# Patient Record
Sex: FEMALE | Race: ASIAN | NOT HISPANIC OR LATINO | URBAN - METROPOLITAN AREA
[De-identification: names, ages, dates, MRNs, and addresses within clinical notes are randomized per-mention and may not be internally consistent; named-entity substitution may affect disease eponyms.]

---

## 2017-04-06 ENCOUNTER — OUTPATIENT (OUTPATIENT)
Dept: OUTPATIENT SERVICES | Facility: HOSPITAL | Age: 62
LOS: 1 days | Discharge: HOME | End: 2017-04-06

## 2017-04-06 VITALS
OXYGEN SATURATION: 96 % | DIASTOLIC BLOOD PRESSURE: 80 MMHG | HEIGHT: 61 IN | RESPIRATION RATE: 18 BRPM | TEMPERATURE: 98 F | HEART RATE: 81 BPM | WEIGHT: 139.99 LBS | SYSTOLIC BLOOD PRESSURE: 132 MMHG

## 2017-04-06 DIAGNOSIS — N63 UNSPECIFIED LUMP IN BREAST: Chronic | ICD-10-CM

## 2017-04-06 DIAGNOSIS — Z01.818 ENCOUNTER FOR OTHER PREPROCEDURAL EXAMINATION: ICD-10-CM

## 2017-04-06 DIAGNOSIS — Z96.659 PRESENCE OF UNSPECIFIED ARTIFICIAL KNEE JOINT: Chronic | ICD-10-CM

## 2017-04-06 DIAGNOSIS — M17.12 UNILATERAL PRIMARY OSTEOARTHRITIS, LEFT KNEE: ICD-10-CM

## 2017-04-06 DIAGNOSIS — M19.90 UNSPECIFIED OSTEOARTHRITIS, UNSPECIFIED SITE: ICD-10-CM

## 2017-04-06 DIAGNOSIS — H93.91 UNSPECIFIED DISORDER OF RIGHT EAR: Chronic | ICD-10-CM

## 2017-04-06 LAB
ANION GAP SERPL CALC-SCNC: 7 MMOL/L — SIGNIFICANT CHANGE UP (ref 5–17)
APTT BLD: 30.2 SEC — SIGNIFICANT CHANGE UP (ref 27.5–37.4)
BASOPHILS # BLD AUTO: 0.1 K/UL — SIGNIFICANT CHANGE UP (ref 0–0.2)
BASOPHILS NFR BLD AUTO: 1.7 % — SIGNIFICANT CHANGE UP (ref 0–2)
BLD GP AB SCN SERPL QL: SIGNIFICANT CHANGE UP
BUN SERPL-MCNC: 12 MG/DL — SIGNIFICANT CHANGE UP (ref 7–23)
CALCIUM SERPL-MCNC: 9.1 MG/DL — SIGNIFICANT CHANGE UP (ref 8.5–10.1)
CHLORIDE SERPL-SCNC: 106 MMOL/L — SIGNIFICANT CHANGE UP (ref 96–108)
CO2 SERPL-SCNC: 30 MMOL/L — SIGNIFICANT CHANGE UP (ref 22–31)
CREAT SERPL-MCNC: 0.63 MG/DL — SIGNIFICANT CHANGE UP (ref 0.5–1.3)
EOSINOPHIL # BLD AUTO: 0.2 K/UL — SIGNIFICANT CHANGE UP (ref 0–0.5)
EOSINOPHIL NFR BLD AUTO: 3.5 % — SIGNIFICANT CHANGE UP (ref 0–6)
GLUCOSE SERPL-MCNC: 91 MG/DL — SIGNIFICANT CHANGE UP (ref 70–99)
HBA1C BLD-MCNC: 5.9 % — HIGH (ref 4–5.6)
HCT VFR BLD CALC: 36.2 % — SIGNIFICANT CHANGE UP (ref 34.5–45)
HGB BLD-MCNC: 12.5 G/DL — SIGNIFICANT CHANGE UP (ref 11.5–15.5)
INR BLD: 1.04 RATIO — SIGNIFICANT CHANGE UP (ref 0.88–1.16)
LYMPHOCYTES # BLD AUTO: 1.8 K/UL — SIGNIFICANT CHANGE UP (ref 1–3.3)
LYMPHOCYTES # BLD AUTO: 30.4 % — SIGNIFICANT CHANGE UP (ref 13–44)
MCHC RBC-ENTMCNC: 30 PG — SIGNIFICANT CHANGE UP (ref 27–34)
MCHC RBC-ENTMCNC: 34.6 GM/DL — SIGNIFICANT CHANGE UP (ref 32–36)
MCV RBC AUTO: 86.6 FL — SIGNIFICANT CHANGE UP (ref 80–100)
MONOCYTES # BLD AUTO: 0.8 K/UL — SIGNIFICANT CHANGE UP (ref 0–0.9)
MONOCYTES NFR BLD AUTO: 12.6 % — SIGNIFICANT CHANGE UP (ref 2–14)
MRSA PCR RESULT.: SIGNIFICANT CHANGE UP
NEUTROPHILS # BLD AUTO: 3.1 K/UL — SIGNIFICANT CHANGE UP (ref 1.8–7.4)
NEUTROPHILS NFR BLD AUTO: 51.7 % — SIGNIFICANT CHANGE UP (ref 43–77)
PLATELET # BLD AUTO: 211 K/UL — SIGNIFICANT CHANGE UP (ref 150–400)
POTASSIUM SERPL-MCNC: 3.9 MMOL/L — SIGNIFICANT CHANGE UP (ref 3.5–5.3)
POTASSIUM SERPL-SCNC: 3.9 MMOL/L — SIGNIFICANT CHANGE UP (ref 3.5–5.3)
PROTHROM AB SERPL-ACNC: 11.4 SEC — SIGNIFICANT CHANGE UP (ref 9.8–12.7)
RBC # BLD: 4.18 M/UL — SIGNIFICANT CHANGE UP (ref 3.8–5.2)
RBC # FLD: 12.4 % — SIGNIFICANT CHANGE UP (ref 11–15)
S AUREUS DNA NOSE QL NAA+PROBE: SIGNIFICANT CHANGE UP
SODIUM SERPL-SCNC: 143 MMOL/L — SIGNIFICANT CHANGE UP (ref 135–145)
WBC # BLD: 6 K/UL — SIGNIFICANT CHANGE UP (ref 3.8–10.5)
WBC # FLD AUTO: 6 K/UL — SIGNIFICANT CHANGE UP (ref 3.8–10.5)

## 2017-04-06 RX ORDER — SODIUM CHLORIDE 9 MG/ML
3 INJECTION INTRAMUSCULAR; INTRAVENOUS; SUBCUTANEOUS EVERY 8 HOURS
Qty: 0 | Refills: 0 | Status: DISCONTINUED | OUTPATIENT
Start: 2017-05-04 | End: 2017-05-04

## 2017-04-06 NOTE — H&P PST ADULT - HISTORY OF PRESENT ILLNESS
61 year old female c/o  left knee pain with osteoarthritis scheduled for left knee replacement . [employer] used  # [id number].

## 2017-04-06 NOTE — H&P PST ADULT - NSANTHOSAYNRD_GEN_A_CORE
No. ALEJANDRO screening performed.  STOP BANG Legend: 0-2 = LOW Risk; 3-4 = INTERMEDIATE Risk; 5-8 = HIGH Risk

## 2017-05-04 ENCOUNTER — INPATIENT (INPATIENT)
Facility: HOSPITAL | Age: 62
LOS: 1 days | Discharge: HOME HEALTH | End: 2017-05-06
Attending: ORTHOPAEDIC SURGERY | Admitting: ORTHOPAEDIC SURGERY
Payer: MEDICARE

## 2017-05-04 ENCOUNTER — TRANSCRIPTION ENCOUNTER (OUTPATIENT)
Age: 62
End: 2017-05-04

## 2017-05-04 ENCOUNTER — RESULT REVIEW (OUTPATIENT)
Age: 62
End: 2017-05-04

## 2017-05-04 VITALS
SYSTOLIC BLOOD PRESSURE: 137 MMHG | RESPIRATION RATE: 15 BRPM | OXYGEN SATURATION: 100 % | WEIGHT: 139.99 LBS | DIASTOLIC BLOOD PRESSURE: 70 MMHG | HEART RATE: 83 BPM | HEIGHT: 61 IN | TEMPERATURE: 98 F

## 2017-05-04 DIAGNOSIS — N63 UNSPECIFIED LUMP IN BREAST: Chronic | ICD-10-CM

## 2017-05-04 DIAGNOSIS — Z96.659 PRESENCE OF UNSPECIFIED ARTIFICIAL KNEE JOINT: Chronic | ICD-10-CM

## 2017-05-04 DIAGNOSIS — H93.91 UNSPECIFIED DISORDER OF RIGHT EAR: Chronic | ICD-10-CM

## 2017-05-04 LAB
ANION GAP SERPL CALC-SCNC: 6 MMOL/L — SIGNIFICANT CHANGE UP (ref 5–17)
BUN SERPL-MCNC: 14 MG/DL — SIGNIFICANT CHANGE UP (ref 7–23)
CALCIUM SERPL-MCNC: 8.3 MG/DL — LOW (ref 8.5–10.1)
CHLORIDE SERPL-SCNC: 106 MMOL/L — SIGNIFICANT CHANGE UP (ref 96–108)
CO2 SERPL-SCNC: 29 MMOL/L — SIGNIFICANT CHANGE UP (ref 22–31)
CREAT SERPL-MCNC: 0.72 MG/DL — SIGNIFICANT CHANGE UP (ref 0.5–1.3)
GLUCOSE SERPL-MCNC: 118 MG/DL — HIGH (ref 70–99)
HCT VFR BLD CALC: 30 % — LOW (ref 34.5–45)
HGB BLD-MCNC: 10.8 G/DL — LOW (ref 11.5–15.5)
MCHC RBC-ENTMCNC: 31 PG — SIGNIFICANT CHANGE UP (ref 27–34)
MCHC RBC-ENTMCNC: 35.9 GM/DL — SIGNIFICANT CHANGE UP (ref 32–36)
MCV RBC AUTO: 86.3 FL — SIGNIFICANT CHANGE UP (ref 80–100)
PLATELET # BLD AUTO: 172 K/UL — SIGNIFICANT CHANGE UP (ref 150–400)
POTASSIUM SERPL-MCNC: 4.5 MMOL/L — SIGNIFICANT CHANGE UP (ref 3.5–5.3)
POTASSIUM SERPL-SCNC: 4.5 MMOL/L — SIGNIFICANT CHANGE UP (ref 3.5–5.3)
RBC # BLD: 3.48 M/UL — LOW (ref 3.8–5.2)
RBC # FLD: 12.4 % — SIGNIFICANT CHANGE UP (ref 11–15)
SODIUM SERPL-SCNC: 141 MMOL/L — SIGNIFICANT CHANGE UP (ref 135–145)
WBC # BLD: 5.2 K/UL — SIGNIFICANT CHANGE UP (ref 3.8–10.5)
WBC # FLD AUTO: 5.2 K/UL — SIGNIFICANT CHANGE UP (ref 3.8–10.5)

## 2017-05-04 PROCEDURE — 88305 TISSUE EXAM BY PATHOLOGIST: CPT | Mod: 26

## 2017-05-04 PROCEDURE — 88311 DECALCIFY TISSUE: CPT | Mod: 26

## 2017-05-04 PROCEDURE — 73560 X-RAY EXAM OF KNEE 1 OR 2: CPT | Mod: 26,LT

## 2017-05-04 RX ORDER — ENOXAPARIN SODIUM 100 MG/ML
30 INJECTION SUBCUTANEOUS EVERY 12 HOURS
Qty: 0 | Refills: 0 | Status: DISCONTINUED | OUTPATIENT
Start: 2017-05-05 | End: 2017-05-06

## 2017-05-04 RX ORDER — SODIUM CHLORIDE 9 MG/ML
1000 INJECTION, SOLUTION INTRAVENOUS
Qty: 0 | Refills: 0 | Status: DISCONTINUED | OUTPATIENT
Start: 2017-05-04 | End: 2017-05-04

## 2017-05-04 RX ORDER — FOLIC ACID 0.8 MG
1 TABLET ORAL DAILY
Qty: 0 | Refills: 0 | Status: DISCONTINUED | OUTPATIENT
Start: 2017-05-04 | End: 2017-05-06

## 2017-05-04 RX ORDER — ONDANSETRON 8 MG/1
4 TABLET, FILM COATED ORAL EVERY 6 HOURS
Qty: 0 | Refills: 0 | Status: DISCONTINUED | OUTPATIENT
Start: 2017-05-04 | End: 2017-05-06

## 2017-05-04 RX ORDER — FERROUS SULFATE 325(65) MG
325 TABLET ORAL
Qty: 0 | Refills: 0 | Status: DISCONTINUED | OUTPATIENT
Start: 2017-05-04 | End: 2017-05-06

## 2017-05-04 RX ORDER — BENZOCAINE AND MENTHOL 5; 1 G/100ML; G/100ML
1 LIQUID ORAL
Qty: 0 | Refills: 0 | Status: DISCONTINUED | OUTPATIENT
Start: 2017-05-04 | End: 2017-05-06

## 2017-05-04 RX ORDER — ASCORBIC ACID 60 MG
500 TABLET,CHEWABLE ORAL
Qty: 0 | Refills: 0 | Status: DISCONTINUED | OUTPATIENT
Start: 2017-05-04 | End: 2017-05-06

## 2017-05-04 RX ORDER — MAGNESIUM HYDROXIDE 400 MG/1
30 TABLET, CHEWABLE ORAL DAILY
Qty: 0 | Refills: 0 | Status: DISCONTINUED | OUTPATIENT
Start: 2017-05-04 | End: 2017-05-06

## 2017-05-04 RX ORDER — CYCLOBENZAPRINE HYDROCHLORIDE 10 MG/1
5 TABLET, FILM COATED ORAL THREE TIMES A DAY
Qty: 0 | Refills: 0 | Status: DISCONTINUED | OUTPATIENT
Start: 2017-05-04 | End: 2017-05-06

## 2017-05-04 RX ORDER — OXYCODONE HYDROCHLORIDE 5 MG/1
20 TABLET ORAL ONCE
Qty: 0 | Refills: 0 | Status: DISCONTINUED | OUTPATIENT
Start: 2017-05-04 | End: 2017-05-04

## 2017-05-04 RX ORDER — DIPHENHYDRAMINE HCL 50 MG
50 CAPSULE ORAL AT BEDTIME
Qty: 0 | Refills: 0 | Status: DISCONTINUED | OUTPATIENT
Start: 2017-05-04 | End: 2017-05-06

## 2017-05-04 RX ORDER — ACETAMINOPHEN 500 MG
650 TABLET ORAL EVERY 6 HOURS
Qty: 0 | Refills: 0 | Status: DISCONTINUED | OUTPATIENT
Start: 2017-05-04 | End: 2017-05-06

## 2017-05-04 RX ORDER — ACETAMINOPHEN 500 MG
1000 TABLET ORAL ONCE
Qty: 0 | Refills: 0 | Status: COMPLETED | OUTPATIENT
Start: 2017-05-04 | End: 2017-05-04

## 2017-05-04 RX ORDER — SODIUM CHLORIDE 9 MG/ML
1000 INJECTION INTRAMUSCULAR; INTRAVENOUS; SUBCUTANEOUS
Qty: 0 | Refills: 0 | Status: DISCONTINUED | OUTPATIENT
Start: 2017-05-04 | End: 2017-05-05

## 2017-05-04 RX ORDER — OXYCODONE HYDROCHLORIDE 5 MG/1
5 TABLET ORAL EVERY 4 HOURS
Qty: 0 | Refills: 0 | Status: DISCONTINUED | OUTPATIENT
Start: 2017-05-04 | End: 2017-05-06

## 2017-05-04 RX ORDER — CELECOXIB 200 MG/1
200 CAPSULE ORAL ONCE
Qty: 0 | Refills: 0 | Status: COMPLETED | OUTPATIENT
Start: 2017-05-04 | End: 2017-05-04

## 2017-05-04 RX ORDER — SENNA PLUS 8.6 MG/1
2 TABLET ORAL AT BEDTIME
Qty: 0 | Refills: 0 | Status: DISCONTINUED | OUTPATIENT
Start: 2017-05-04 | End: 2017-05-06

## 2017-05-04 RX ORDER — ACETAMINOPHEN 500 MG
650 TABLET ORAL ONCE
Qty: 0 | Refills: 0 | Status: COMPLETED | OUTPATIENT
Start: 2017-05-04 | End: 2017-05-04

## 2017-05-04 RX ORDER — HYDROMORPHONE HYDROCHLORIDE 2 MG/ML
0.5 INJECTION INTRAMUSCULAR; INTRAVENOUS; SUBCUTANEOUS
Qty: 0 | Refills: 0 | Status: DISCONTINUED | OUTPATIENT
Start: 2017-05-04 | End: 2017-05-06

## 2017-05-04 RX ORDER — OXYCODONE HYDROCHLORIDE 5 MG/1
10 TABLET ORAL EVERY 4 HOURS
Qty: 0 | Refills: 0 | Status: DISCONTINUED | OUTPATIENT
Start: 2017-05-04 | End: 2017-05-06

## 2017-05-04 RX ORDER — FENTANYL CITRATE 50 UG/ML
25 INJECTION INTRAVENOUS
Qty: 0 | Refills: 0 | Status: DISCONTINUED | OUTPATIENT
Start: 2017-05-04 | End: 2017-05-06

## 2017-05-04 RX ORDER — CEFAZOLIN SODIUM 1 G
2000 VIAL (EA) INJECTION EVERY 8 HOURS
Qty: 0 | Refills: 0 | Status: COMPLETED | OUTPATIENT
Start: 2017-05-04 | End: 2017-05-05

## 2017-05-04 RX ORDER — DOCUSATE SODIUM 100 MG
100 CAPSULE ORAL THREE TIMES A DAY
Qty: 0 | Refills: 0 | Status: DISCONTINUED | OUTPATIENT
Start: 2017-05-04 | End: 2017-05-06

## 2017-05-04 RX ORDER — DIPHENHYDRAMINE HCL 50 MG
25 CAPSULE ORAL EVERY 4 HOURS
Qty: 0 | Refills: 0 | Status: DISCONTINUED | OUTPATIENT
Start: 2017-05-04 | End: 2017-05-06

## 2017-05-04 RX ORDER — POLYETHYLENE GLYCOL 3350 17 G/17G
17 POWDER, FOR SOLUTION ORAL DAILY
Qty: 0 | Refills: 0 | Status: DISCONTINUED | OUTPATIENT
Start: 2017-05-04 | End: 2017-05-06

## 2017-05-04 RX ADMIN — CELECOXIB 200 MILLIGRAM(S): 200 CAPSULE ORAL at 10:18

## 2017-05-04 RX ADMIN — CELECOXIB 200 MILLIGRAM(S): 200 CAPSULE ORAL at 10:19

## 2017-05-04 RX ADMIN — Medication 650 MILLIGRAM(S): at 10:19

## 2017-05-04 RX ADMIN — Medication 100 MILLIGRAM(S): at 18:49

## 2017-05-04 RX ADMIN — SODIUM CHLORIDE 75 MILLILITER(S): 9 INJECTION, SOLUTION INTRAVENOUS at 15:13

## 2017-05-04 RX ADMIN — Medication 325 MILLIGRAM(S): at 17:58

## 2017-05-04 RX ADMIN — Medication 100 MILLIGRAM(S): at 22:18

## 2017-05-04 RX ADMIN — OXYCODONE HYDROCHLORIDE 20 MILLIGRAM(S): 5 TABLET ORAL at 10:19

## 2017-05-04 RX ADMIN — Medication 500 MILLIGRAM(S): at 17:58

## 2017-05-04 RX ADMIN — Medication 650 MILLIGRAM(S): at 10:18

## 2017-05-04 RX ADMIN — Medication 1 TABLET(S): at 22:18

## 2017-05-04 RX ADMIN — SODIUM CHLORIDE 75 MILLILITER(S): 9 INJECTION INTRAMUSCULAR; INTRAVENOUS; SUBCUTANEOUS at 17:59

## 2017-05-04 NOTE — PHYSICAL THERAPY INITIAL EVALUATION ADULT - ADDITIONAL COMMENTS
Used [employer] phone during session to gather information: Elisha language,  name Carlos, ID#[id number]

## 2017-05-04 NOTE — DISCHARGE NOTE ADULT - PLAN OF CARE
return to baseline ADLs 1.	Pain Control  2.	Walking with full weight bearing as tolerated, with assistive devices (walker/Cane as Needed). Encourage daily PT and Active/passive range of motion of knee to prevent stiffness.  3.	DVT Prophylaxis for 30 days, Lovenox 30 mg subcutaneous every 12 hrs for a total of 30 days. do not skip doses.  4.	PT as needed  5.	Follow up with Dr. Boo as Outpatient in 10-14 Days after Discharge from the Hospital or Rehab. Call Office For Appointment. [phone].  6.	Remove Staples Post-Op Day 14, and Remove Dressing Post-Op Day 10, with Daily Dressing Changes as Need.  7.	Ice/Elevate affected area as Needed  8.	Keep Dressing/Splint/Cast Clean and dry. 1.	Pain Control  2.	Walking with full weight bearing as tolerated, with assistive devices (walker/Cane as Needed). Encourage daily PT and Active/passive range of motion of knee to prevent stiffness.  3.	DVT Prophylaxis for 30 days, Lovenox 30 mg subcutaneous every 12 hrs for a total of 14 days. Day 15 Start aspirin 325 mg twice a day for additional 14 days. do not skip doses.  4.	PT as needed  5.	Follow up with Dr. Boo as Outpatient in 10-14 Days after Discharge from the Hospital or Rehab. Call Office For Appointment. [phone].  6.	Remove Staples Post-Op Day 14, and Remove Dressing Post-Op Day 10, with Daily Dressing Changes as Need.  7.	Ice/Elevate affected area as Needed  8.	Keep Dressing/Splint/Cast Clean and dry.

## 2017-05-04 NOTE — PROGRESS NOTE ADULT - ASSESSMENT
S/P Lt TKA    Plan:  PT/WBAT LLE, ROM Lt knee  Keep the Lt knee in full extension when on bed  Pain managements  DVT prophylaxis  F/U labs  NV and compartments check LLE  Elebvation Lt knee

## 2017-05-04 NOTE — PHYSICAL THERAPY INITIAL EVALUATION ADULT - CRITERIA FOR SKILLED THERAPEUTIC INTERVENTIONS
risk reduction/prevention/impairments found/functional limitations in following categories/anticipated discharge recommendation

## 2017-05-04 NOTE — DISCHARGE NOTE ADULT - MEDICATION SUMMARY - MEDICATIONS TO STOP TAKING
I will STOP taking the medications listed below when I get home from the hospital:    traMADol 50 mg oral tablet  -- 1 tab(s) by mouth 3 times a day, As Needed

## 2017-05-04 NOTE — PHYSICAL THERAPY INITIAL EVALUATION ADULT - PATIENT/FAMILY AGREES WITH PLAN
yes/home with Physical therapy services (as per patient and her son she already has cane, rollng walker and toilet seat

## 2017-05-04 NOTE — DISCHARGE NOTE ADULT - CARE PLAN
Principal Discharge DX:	S/P knee replacement  Goal:	return to baseline ADLs  Instructions for follow-up, activity and diet:	1.	Pain Control  2.	Walking with full weight bearing as tolerated, with assistive devices (walker/Cane as Needed). Encourage daily PT and Active/passive range of motion of knee to prevent stiffness.  3.	DVT Prophylaxis for 30 days, Lovenox 30 mg subcutaneous every 12 hrs for a total of 30 days. do not skip doses.  4.	PT as needed  5.	Follow up with Dr. Boo as Outpatient in 10-14 Days after Discharge from the Hospital or Rehab. Call Office For Appointment. [phone].  6.	Remove Staples Post-Op Day 14, and Remove Dressing Post-Op Day 10, with Daily Dressing Changes as Need.  7.	Ice/Elevate affected area as Needed  8.	Keep Dressing/Splint/Cast Clean and dry. Principal Discharge DX:	S/P knee replacement  Goal:	return to baseline ADLs  Instructions for follow-up, activity and diet:	1.	Pain Control  2.	Walking with full weight bearing as tolerated, with assistive devices (walker/Cane as Needed). Encourage daily PT and Active/passive range of motion of knee to prevent stiffness.  3.	DVT Prophylaxis for 30 days, Lovenox 30 mg subcutaneous every 12 hrs for a total of 30 days. do not skip doses.  4.	PT as needed  5.	Follow up with Dr. Boo as Outpatient in 10-14 Days after Discharge from the Hospital or Rehab. Call Office For Appointment. [phone].  6.	Remove Staples Post-Op Day 14, and Remove Dressing Post-Op Day 10, with Daily Dressing Changes as Need.  7.	Ice/Elevate affected area as Needed  8.	Keep Dressing/Splint/Cast Clean and dry. Principal Discharge DX:	S/P knee replacement  Goal:	return to baseline ADLs  Instructions for follow-up, activity and diet:	1.	Pain Control  2.	Walking with full weight bearing as tolerated, with assistive devices (walker/Cane as Needed). Encourage daily PT and Active/passive range of motion of knee to prevent stiffness.  3.	DVT Prophylaxis for 30 days, Lovenox 30 mg subcutaneous every 12 hrs for a total of 30 days. do not skip doses.  4.	PT as needed  5.	Follow up with Dr. Boo as Outpatient in 10-14 Days after Discharge from the Hospital or Rehab. Call Office For Appointment. [phone].  6.	Remove Staples Post-Op Day 14, and Remove Dressing Post-Op Day 10, with Daily Dressing Changes as Need.  7.	Ice/Elevate affected area as Needed  8.	Keep Dressing/Splint/Cast Clean and dry. Principal Discharge DX:	S/P knee replacement  Goal:	return to baseline ADLs  Instructions for follow-up, activity and diet:	1.	Pain Control  2.	Walking with full weight bearing as tolerated, with assistive devices (walker/Cane as Needed). Encourage daily PT and Active/passive range of motion of knee to prevent stiffness.  3.	DVT Prophylaxis for 30 days, Lovenox 30 mg subcutaneous every 12 hrs for a total of 30 days. do not skip doses.  4.	PT as needed  5.	Follow up with Dr. Boo as Outpatient in 10-14 Days after Discharge from the Hospital or Rehab. Call Office For Appointment. [phone].  6.	Remove Staples Post-Op Day 14, and Remove Dressing Post-Op Day 10, with Daily Dressing Changes as Need.  7.	Ice/Elevate affected area as Needed  8.	Keep Dressing/Splint/Cast Clean and dry. Principal Discharge DX:	S/P knee replacement  Goal:	return to baseline ADLs  Instructions for follow-up, activity and diet:	1.	Pain Control  2.	Walking with full weight bearing as tolerated, with assistive devices (walker/Cane as Needed). Encourage daily PT and Active/passive range of motion of knee to prevent stiffness.  3.	DVT Prophylaxis for 30 days, Lovenox 30 mg subcutaneous every 12 hrs for a total of 30 days. do not skip doses.  4.	PT as needed  5.	Follow up with Dr. Boo as Outpatient in 10-14 Days after Discharge from the Hospital or Rehab. Call Office For Appointment. [phone].  6.	Remove Staples Post-Op Day 14, and Remove Dressing Post-Op Day 10, with Daily Dressing Changes as Need.  7.	Ice/Elevate affected area as Needed  8.	Keep Dressing/Splint/Cast Clean and dry. Principal Discharge DX:	S/P knee replacement  Goal:	return to baseline ADLs  Instructions for follow-up, activity and diet:	1.	Pain Control  2.	Walking with full weight bearing as tolerated, with assistive devices (walker/Cane as Needed). Encourage daily PT and Active/passive range of motion of knee to prevent stiffness.  3.	DVT Prophylaxis for 30 days, Lovenox 30 mg subcutaneous every 12 hrs for a total of 14 days. Day 15 Start aspirin 325 mg twice a day for additional 14 days. do not skip doses.  4.	PT as needed  5.	Follow up with Dr. Boo as Outpatient in 10-14 Days after Discharge from the Hospital or Rehab. Call Office For Appointment. [phone].  6.	Remove Staples Post-Op Day 14, and Remove Dressing Post-Op Day 10, with Daily Dressing Changes as Need.  7.	Ice/Elevate affected area as Needed  8.	Keep Dressing/Splint/Cast Clean and dry. Principal Discharge DX:	S/P knee replacement  Goal:	return to baseline ADLs  Instructions for follow-up, activity and diet:	1.	Pain Control  2.	Walking with full weight bearing as tolerated, with assistive devices (walker/Cane as Needed). Encourage daily PT and Active/passive range of motion of knee to prevent stiffness.  3.	DVT Prophylaxis for 30 days, Lovenox 30 mg subcutaneous every 12 hrs for a total of 14 days. Day 15 Start aspirin 325 mg twice a day for additional 14 days. do not skip doses.  4.	PT as needed  5.	Follow up with Dr. Boo as Outpatient in 10-14 Days after Discharge from the Hospital or Rehab. Call Office For Appointment. [phone].  6.	Remove Staples Post-Op Day 14, and Remove Dressing Post-Op Day 10, with Daily Dressing Changes as Need.  7.	Ice/Elevate affected area as Needed  8.	Keep Dressing/Splint/Cast Clean and dry.

## 2017-05-04 NOTE — PHYSICAL THERAPY INITIAL EVALUATION ADULT - GENERAL OBSERVATIONS, REHAB EVAL
Found supine in stretcher, awake, dressing dry and intact, hemovac in place, c/o pain and tightness in the left knee, on room air, has IV line.

## 2017-05-04 NOTE — DISCHARGE NOTE ADULT - HOSPITAL COURSE
The patient is a 61 year old female status post elective total knee Arthroplasty to the left knee after failing outpatient nonoperative conservative management.  Patient presented to [facility] after being medically cleared for an elective surgical procedure. The patient was taken to the operating room on date mentioned above. Prophylactic antibiotics were started before the procedure and continued for 24 hours.  There were no complications during the procedure and patient tolerated the procedure well.  The patient was transferred to recovery room in stable condition and subsequently to surgical floor. Patient was placed on Lovenox for anticoagulation.  All home medications were continued.  The patient received physical therapy daily and daily labs were followed.  The dressing was kept clean, dry, intact and changed on POD 3.  The rest of the hospital stay was unremarkable. The patient is a 61 year old female status post elective total knee Arthroplasty to the left knee after failing outpatient nonoperative conservative management.  Patient presented to [facility] after being medically cleared for an elective surgical procedure. The patient was taken to the operating room on date mentioned above. Prophylactic antibiotics were started before the procedure and continued for 24 hours.  There were no complications during the procedure and patient tolerated the procedure well.  The patient was transferred to recovery room in stable condition and subsequently to surgical floor. Patient was placed on Lovenox for anticoagulation.  All home medications were continued.  The patient received physical therapy daily and daily labs were followed.  The dressing was kept clean, dry, intact. The rest of the hospital stay was unremarkable.

## 2017-05-04 NOTE — DISCHARGE NOTE ADULT - CARE PROVIDER_API CALL
Sarbjit Boo (DO), Orthopaedic Surgery  [address] [place] [address]  Phone: [phone]  Fax: [phone]

## 2017-05-04 NOTE — PROGRESS NOTE ADULT - SUBJECTIVE AND OBJECTIVE BOX
Patient seen and examined  Mild Lt knee pain    PE:  Dressing D/C/I  NVI LLE  Compartments soft LLE  FHL/EHL/TA/GC intact B/L LE

## 2017-05-04 NOTE — PHYSICAL THERAPY INITIAL EVALUATION ADULT - COORDINATION ASSESSED, REHAB EVAL
LLE impaired due to pain and tightness in the left knee/heel to shin heel to shin/LLE impaired due to pain and tightness in the left knee

## 2017-05-04 NOTE — PHYSICAL THERAPY INITIAL EVALUATION ADULT - MANUAL MUSCLE TESTING RESULTS, REHAB EVAL
grossly assessed due to/UE and RLE grossly 4+/5 to 5/5; LLE quads and hamstrings 3/5 to 4-/5 left quads and hamstrings

## 2017-05-04 NOTE — PROGRESS NOTE ADULT - SUBJECTIVE AND OBJECTIVE BOX
Patient seen and examined. Pain controlled. Tolerated procedure well.    Vital Signs Last 24 Hrs  T(C): 36.9, Max: 36.9 (05-04 @ 17:22)  T(F): 98.4, Max: 98.4 (05-04 @ 17:22)  HR: 78 (78 - 96)  BP: 123/65 (113/70 - 144/76)  BP(mean): --  RR: 16 (14 - 19)  SpO2: 98% (96% - 100%)    Physical Exam  Gen: NAD  LLE:   Dressing c/d/i  +EHL/FHL/Gsc/TA  SILT L3-S1  DP +  Compartments soft  No calf ttp    A/P: 61y Female sp L TKA POD 0  Pain control  DVT ppx  PT/OT, WBAT  FU labs  Dispo planning  D/w attending

## 2017-05-04 NOTE — DISCHARGE NOTE ADULT - PATIENT PORTAL LINK FT
“You can access the [facility] Patient Portal, offered by [facility], by registering with the following website: [email or web address]”

## 2017-05-04 NOTE — DISCHARGE NOTE ADULT - MEDICATION SUMMARY - MEDICATIONS TO TAKE
I will START or STAY ON the medications listed below when I get home from the hospital:    traMADol 50 mg oral tablet  -- 1 tab(s) by mouth 3 times a day, As Needed  -- Indication: For per pmd    Percocet 5/325 oral tablet  -- 1 tab(s) by mouth every 4 hours MDD:6  -- Caution federal law prohibits the transfer of this drug to any person other  than the person for whom it was prescribed.  May cause drowsiness.  Alcohol may intensify this effect.  Use care when operating dangerous machinery.  This prescription cannot be refilled.  This product contains acetaminophen.  Do not use  with any other product containing acetaminophen to prevent possible liver damage.  Using more of this medication than prescribed may cause serious breathing problems.    -- Indication: For prn pain    Lovenox 40 mg/0.4 mL injectable solution  -- 40 milligram(s) injectable once a day for DVT ppx MDD:1 injection  -- It is very important that you take or use this exactly as directed.  Do not skip doses or discontinue unless directed by your doctor.    -- Indication: For dvt ppx for 28 days I will START or STAY ON the medications listed below when I get home from the hospital:    Percocet 5/325 oral tablet  -- 1 tab(s) by mouth every 4 hours MDD:6  -- Caution federal law prohibits the transfer of this drug to any person other  than the person for whom it was prescribed.  May cause drowsiness.  Alcohol may intensify this effect.  Use care when operating dangerous machinery.  This prescription cannot be refilled.  This product contains acetaminophen.  Do not use  with any other product containing acetaminophen to prevent possible liver damage.  Using more of this medication than prescribed may cause serious breathing problems.    -- Indication: For prn pain    Lovenox 30 mg/0.3 mL injectable solution  -- 30 milligram(s) injectable every 12 hours for dvt ppx. do not skip doses  -- It is very important that you take or use this exactly as directed.  Do not skip doses or discontinue unless directed by your doctor.    -- Indication: For for dvt ppx I will START or STAY ON the medications listed below when I get home from the hospital:    Percocet 5/325 oral tablet  -- 1 tab(s) by mouth every 4 hours MDD:6  -- Caution federal law prohibits the transfer of this drug to any person other  than the person for whom it was prescribed.  May cause drowsiness.  Alcohol may intensify this effect.  Use care when operating dangerous machinery.  This prescription cannot be refilled.  This product contains acetaminophen.  Do not use  with any other product containing acetaminophen to prevent possible liver damage.  Using more of this medication than prescribed may cause serious breathing problems.    -- Indication: For prn pain    Ecotrin 325 mg oral delayed release tablet  -- 1 tab(s) by mouth 2 times a day for dvt ppx. do not skip doses. begin day 15 after finishing lovenox 30 mg twice a day for 14 days.  -- Swallow whole.  Do not crush.  Take with food or milk.    -- Indication: For for dvt ppx    Lovenox 30 mg/0.3 mL injectable solution  -- 30 milligram(s) injectable 2 times a day for 14 days do not skip doses  -- It is very important that you take or use this exactly as directed.  Do not skip doses or discontinue unless directed by your doctor.    -- Indication: For for dvt ppx

## 2017-05-05 LAB
ANION GAP SERPL CALC-SCNC: 9 MMOL/L — SIGNIFICANT CHANGE UP (ref 5–17)
BUN SERPL-MCNC: 14 MG/DL — SIGNIFICANT CHANGE UP (ref 7–23)
CALCIUM SERPL-MCNC: 8.4 MG/DL — LOW (ref 8.5–10.1)
CHLORIDE SERPL-SCNC: 108 MMOL/L — SIGNIFICANT CHANGE UP (ref 96–108)
CO2 SERPL-SCNC: 27 MMOL/L — SIGNIFICANT CHANGE UP (ref 22–31)
CREAT SERPL-MCNC: 0.77 MG/DL — SIGNIFICANT CHANGE UP (ref 0.5–1.3)
GLUCOSE SERPL-MCNC: 133 MG/DL — HIGH (ref 70–99)
HCT VFR BLD CALC: 29.2 % — LOW (ref 34.5–45)
HGB BLD-MCNC: 10 G/DL — LOW (ref 11.5–15.5)
MCHC RBC-ENTMCNC: 29.9 PG — SIGNIFICANT CHANGE UP (ref 27–34)
MCHC RBC-ENTMCNC: 34.3 GM/DL — SIGNIFICANT CHANGE UP (ref 32–36)
MCV RBC AUTO: 87.2 FL — SIGNIFICANT CHANGE UP (ref 80–100)
PLATELET # BLD AUTO: 201 K/UL — SIGNIFICANT CHANGE UP (ref 150–400)
POTASSIUM SERPL-MCNC: 4.3 MMOL/L — SIGNIFICANT CHANGE UP (ref 3.5–5.3)
POTASSIUM SERPL-SCNC: 4.3 MMOL/L — SIGNIFICANT CHANGE UP (ref 3.5–5.3)
RBC # BLD: 3.34 M/UL — LOW (ref 3.8–5.2)
RBC # FLD: 12.7 % — SIGNIFICANT CHANGE UP (ref 11–15)
SODIUM SERPL-SCNC: 144 MMOL/L — SIGNIFICANT CHANGE UP (ref 135–145)
WBC # BLD: 12.9 K/UL — HIGH (ref 3.8–10.5)
WBC # FLD AUTO: 12.9 K/UL — HIGH (ref 3.8–10.5)

## 2017-05-05 PROCEDURE — 73560 X-RAY EXAM OF KNEE 1 OR 2: CPT | Mod: 26,LT

## 2017-05-05 RX ORDER — KETOROLAC TROMETHAMINE 30 MG/ML
30 SYRINGE (ML) INJECTION ONCE
Qty: 0 | Refills: 0 | Status: DISCONTINUED | OUTPATIENT
Start: 2017-05-05 | End: 2017-05-05

## 2017-05-05 RX ORDER — ENOXAPARIN SODIUM 100 MG/ML
40 INJECTION SUBCUTANEOUS
Qty: 28 | Refills: 0 | OUTPATIENT
Start: 2017-05-05 | End: 2017-06-02

## 2017-05-05 RX ADMIN — HYDROMORPHONE HYDROCHLORIDE 0.5 MILLIGRAM(S): 2 INJECTION INTRAMUSCULAR; INTRAVENOUS; SUBCUTANEOUS at 10:35

## 2017-05-05 RX ADMIN — Medication 1 TABLET(S): at 22:18

## 2017-05-05 RX ADMIN — POLYETHYLENE GLYCOL 3350 17 GRAM(S): 17 POWDER, FOR SOLUTION ORAL at 12:31

## 2017-05-05 RX ADMIN — OXYCODONE HYDROCHLORIDE 10 MILLIGRAM(S): 5 TABLET ORAL at 07:05

## 2017-05-05 RX ADMIN — Medication 30 MILLIGRAM(S): at 19:04

## 2017-05-05 RX ADMIN — Medication 100 MILLIGRAM(S): at 13:52

## 2017-05-05 RX ADMIN — OXYCODONE HYDROCHLORIDE 5 MILLIGRAM(S): 5 TABLET ORAL at 01:28

## 2017-05-05 RX ADMIN — Medication 500 MILLIGRAM(S): at 17:54

## 2017-05-05 RX ADMIN — ENOXAPARIN SODIUM 30 MILLIGRAM(S): 100 INJECTION SUBCUTANEOUS at 06:00

## 2017-05-05 RX ADMIN — Medication 1 TABLET(S): at 06:00

## 2017-05-05 RX ADMIN — OXYCODONE HYDROCHLORIDE 10 MILLIGRAM(S): 5 TABLET ORAL at 12:31

## 2017-05-05 RX ADMIN — Medication 30 MILLIGRAM(S): at 14:10

## 2017-05-05 RX ADMIN — OXYCODONE HYDROCHLORIDE 10 MILLIGRAM(S): 5 TABLET ORAL at 23:15

## 2017-05-05 RX ADMIN — Medication 325 MILLIGRAM(S): at 07:54

## 2017-05-05 RX ADMIN — Medication 30 MILLIGRAM(S): at 13:53

## 2017-05-05 RX ADMIN — Medication 100 MILLIGRAM(S): at 06:00

## 2017-05-05 RX ADMIN — OXYCODONE HYDROCHLORIDE 10 MILLIGRAM(S): 5 TABLET ORAL at 13:18

## 2017-05-05 RX ADMIN — ENOXAPARIN SODIUM 30 MILLIGRAM(S): 100 INJECTION SUBCUTANEOUS at 17:54

## 2017-05-05 RX ADMIN — SODIUM CHLORIDE 75 MILLILITER(S): 9 INJECTION INTRAMUSCULAR; INTRAVENOUS; SUBCUTANEOUS at 06:01

## 2017-05-05 RX ADMIN — OXYCODONE HYDROCHLORIDE 10 MILLIGRAM(S): 5 TABLET ORAL at 06:05

## 2017-05-05 RX ADMIN — Medication 325 MILLIGRAM(S): at 17:54

## 2017-05-05 RX ADMIN — OXYCODONE HYDROCHLORIDE 5 MILLIGRAM(S): 5 TABLET ORAL at 02:28

## 2017-05-05 RX ADMIN — Medication 30 MILLIGRAM(S): at 19:19

## 2017-05-05 RX ADMIN — OXYCODONE HYDROCHLORIDE 10 MILLIGRAM(S): 5 TABLET ORAL at 22:17

## 2017-05-05 RX ADMIN — Medication 500 MILLIGRAM(S): at 06:00

## 2017-05-05 RX ADMIN — Medication 1 TABLET(S): at 13:52

## 2017-05-05 RX ADMIN — HYDROMORPHONE HYDROCHLORIDE 0.5 MILLIGRAM(S): 2 INJECTION INTRAMUSCULAR; INTRAVENOUS; SUBCUTANEOUS at 10:19

## 2017-05-05 RX ADMIN — Medication 1 MILLIGRAM(S): at 12:31

## 2017-05-05 RX ADMIN — Medication 325 MILLIGRAM(S): at 12:31

## 2017-05-05 RX ADMIN — Medication 100 MILLIGRAM(S): at 03:28

## 2017-05-05 RX ADMIN — Medication 1 TABLET(S): at 12:31

## 2017-05-05 RX ADMIN — Medication 100 MILLIGRAM(S): at 22:18

## 2017-05-05 NOTE — OCCUPATIONAL THERAPY INITIAL EVALUATION ADULT - TRANSFER SAFETY CONCERNS NOTED: BED/CHAIR, REHAB EVAL
stepping too close to front of assistive device/decreased weight-shifting ability/decreased sequencing ability/decreased step length/decreased safety awareness/inability to maintain weight-bearing restrictions w/o assist

## 2017-05-05 NOTE — OCCUPATIONAL THERAPY INITIAL EVALUATION ADULT - TRANSFER SAFETY CONCERNS NOTED: SIT/STAND, REHAB EVAL
decreased step length/decreased safety awareness/decreased sequencing ability/stepping too close to front of assistive device/decreased weight-shifting ability/inability to maintain weight-bearing restrictions w/o assist

## 2017-05-05 NOTE — PROGRESS NOTE ADULT - SUBJECTIVE AND OBJECTIVE BOX
Patient seen and examined. Pain controlled. No acute events overnight    Vital Signs Last 24 Hrs  T(C): 36.2, Max: 37 (05-04 @ 18:12)  T(F): 97.1, Max: 98.6 (05-04 @ 18:12)  HR: 68 (68 - 96)  BP: 103/56 (103/56 - 144/76)  BP(mean): --  RR: 97 (14 - 97)  SpO2: 95% (94% - 100%)    Physical Exam  Gen: NAD  LLE:   Dressing c/d/i  +EHL/FHL/Gsc/TA  SILT L3-S1  DP +  Compartments soft  No calf ttp    A/P: 61y Female sp L TKA POD 1  Pain control  DVT ppx  PT/OT, WBAT  FU labs  Dispo planning  D/w attending

## 2017-05-05 NOTE — OCCUPATIONAL THERAPY INITIAL EVALUATION ADULT - RANGE OF MOTION EXAMINATION, LOWER EXTREMITY
Right LE Active ROM was WFL   (within functional limits)/LLE AROM hip flexion WFL, LLE AROM knee flexion WFL, LLE AROM distally to knee WFL./Left LE Passive ROM was WNL (within normal limits)/Right LE Passive ROM was WFL  (within functional limits)

## 2017-05-05 NOTE — OCCUPATIONAL THERAPY INITIAL EVALUATION ADULT - MODIFIED CLINICAL TEST OF SENSORY INTEGRATION IN BALANCE TEST
Barthel Index: Feeding Score___10___, Bathing Score___5___, Grooming Score__5___, Dressing Score__10___, Bowel Score__10___, Bladder Score___10___, Toilet Score__10___, Transfer Score__10____, Mobility Score___0__, Stairs Score___0__, Total Score__70___.

## 2017-05-05 NOTE — OCCUPATIONAL THERAPY INITIAL EVALUATION ADULT - GENERAL OBSERVATIONS, REHAB EVAL
Pt was encountered OOB in chair with son Trish; NAD, S/P L TKR POD 1, LLE dressing in ace wrap clean, dry and intact, AXOX4, cooperative, followed commands; pt c/o pain in L knee due to s/p L TKR which limits pt performance with functional ADL's/transfers and mobility. Patient is a native rob speaker and requires [employer]  service. Patient declined [employer] phone and wanted son to interpret for her.

## 2017-05-05 NOTE — OCCUPATIONAL THERAPY INITIAL EVALUATION ADULT - ADDITIONAL COMMENTS
Patient lives in a private house with 2 steps with railings on both sides. Once inside, the patient lives on the main level where the bedroom and bathroom is. The patients bathroom has a tub/shower combination with a raised toilet seat and no other equipment. The patient was ambulating with no device, but does have a cane and rolling walker. The patient was not driving prior to hospitalization. The patients son was driving.

## 2017-05-05 NOTE — OCCUPATIONAL THERAPY INITIAL EVALUATION ADULT - ANTICIPATED DISCHARGE DISPOSITION, OT EVAL
Patient will require home OT/PT environmental assessment to assess need for equipment at home in order for patient to perform functional ADL's/transfers and mobility./home w/ OT

## 2017-05-06 VITALS
RESPIRATION RATE: 16 BRPM | SYSTOLIC BLOOD PRESSURE: 130 MMHG | DIASTOLIC BLOOD PRESSURE: 81 MMHG | OXYGEN SATURATION: 98 % | HEART RATE: 98 BPM | TEMPERATURE: 99 F

## 2017-05-06 LAB
ANION GAP SERPL CALC-SCNC: 10 MMOL/L — SIGNIFICANT CHANGE UP (ref 5–17)
BUN SERPL-MCNC: 13 MG/DL — SIGNIFICANT CHANGE UP (ref 7–23)
CALCIUM SERPL-MCNC: 8.8 MG/DL — SIGNIFICANT CHANGE UP (ref 8.5–10.1)
CHLORIDE SERPL-SCNC: 102 MMOL/L — SIGNIFICANT CHANGE UP (ref 96–108)
CO2 SERPL-SCNC: 30 MMOL/L — SIGNIFICANT CHANGE UP (ref 22–31)
CREAT SERPL-MCNC: 0.59 MG/DL — SIGNIFICANT CHANGE UP (ref 0.5–1.3)
GLUCOSE SERPL-MCNC: 98 MG/DL — SIGNIFICANT CHANGE UP (ref 70–99)
HCT VFR BLD CALC: 29.5 % — LOW (ref 34.5–45)
HGB BLD-MCNC: 10.4 G/DL — LOW (ref 11.5–15.5)
MCHC RBC-ENTMCNC: 30.5 PG — SIGNIFICANT CHANGE UP (ref 27–34)
MCHC RBC-ENTMCNC: 35.4 GM/DL — SIGNIFICANT CHANGE UP (ref 32–36)
MCV RBC AUTO: 86.3 FL — SIGNIFICANT CHANGE UP (ref 80–100)
PLATELET # BLD AUTO: 171 K/UL — SIGNIFICANT CHANGE UP (ref 150–400)
POTASSIUM SERPL-MCNC: 3.5 MMOL/L — SIGNIFICANT CHANGE UP (ref 3.5–5.3)
POTASSIUM SERPL-SCNC: 3.5 MMOL/L — SIGNIFICANT CHANGE UP (ref 3.5–5.3)
RBC # BLD: 3.42 M/UL — LOW (ref 3.8–5.2)
RBC # FLD: 12.3 % — SIGNIFICANT CHANGE UP (ref 11–15)
SODIUM SERPL-SCNC: 142 MMOL/L — SIGNIFICANT CHANGE UP (ref 135–145)
WBC # BLD: 8.6 K/UL — SIGNIFICANT CHANGE UP (ref 3.8–10.5)
WBC # FLD AUTO: 8.6 K/UL — SIGNIFICANT CHANGE UP (ref 3.8–10.5)

## 2017-05-06 RX ORDER — ENOXAPARIN SODIUM 100 MG/ML
30 INJECTION SUBCUTANEOUS
Qty: 60 | Refills: 0 | OUTPATIENT
Start: 2017-05-06 | End: 2017-06-05

## 2017-05-06 RX ORDER — ENOXAPARIN SODIUM 100 MG/ML
30 INJECTION SUBCUTANEOUS
Qty: 28 | Refills: 0 | OUTPATIENT
Start: 2017-05-06 | End: 2017-05-20

## 2017-05-06 RX ORDER — ENOXAPARIN SODIUM 100 MG/ML
30 INJECTION SUBCUTANEOUS
Qty: 30 | Refills: 0 | OUTPATIENT
Start: 2017-05-06 | End: 2017-05-20

## 2017-05-06 RX ORDER — TRAMADOL HYDROCHLORIDE 50 MG/1
1 TABLET ORAL
Qty: 0 | Refills: 0 | COMMUNITY

## 2017-05-06 RX ORDER — ASPIRIN/CALCIUM CARB/MAGNESIUM 324 MG
1 TABLET ORAL
Qty: 28 | Refills: 0 | OUTPATIENT
Start: 2017-05-06 | End: 2017-05-20

## 2017-05-06 RX ADMIN — ENOXAPARIN SODIUM 30 MILLIGRAM(S): 100 INJECTION SUBCUTANEOUS at 05:31

## 2017-05-06 RX ADMIN — HYDROMORPHONE HYDROCHLORIDE 0.5 MILLIGRAM(S): 2 INJECTION INTRAMUSCULAR; INTRAVENOUS; SUBCUTANEOUS at 04:22

## 2017-05-06 RX ADMIN — Medication 1 MILLIGRAM(S): at 11:56

## 2017-05-06 RX ADMIN — HYDROMORPHONE HYDROCHLORIDE 0.5 MILLIGRAM(S): 2 INJECTION INTRAMUSCULAR; INTRAVENOUS; SUBCUTANEOUS at 00:47

## 2017-05-06 RX ADMIN — Medication 1 TABLET(S): at 05:32

## 2017-05-06 RX ADMIN — OXYCODONE HYDROCHLORIDE 10 MILLIGRAM(S): 5 TABLET ORAL at 08:14

## 2017-05-06 RX ADMIN — Medication 325 MILLIGRAM(S): at 11:56

## 2017-05-06 RX ADMIN — POLYETHYLENE GLYCOL 3350 17 GRAM(S): 17 POWDER, FOR SOLUTION ORAL at 11:57

## 2017-05-06 RX ADMIN — Medication 325 MILLIGRAM(S): at 08:14

## 2017-05-06 RX ADMIN — OXYCODONE HYDROCHLORIDE 10 MILLIGRAM(S): 5 TABLET ORAL at 13:40

## 2017-05-06 RX ADMIN — Medication 500 MILLIGRAM(S): at 05:32

## 2017-05-06 RX ADMIN — OXYCODONE HYDROCHLORIDE 10 MILLIGRAM(S): 5 TABLET ORAL at 09:14

## 2017-05-06 RX ADMIN — OXYCODONE HYDROCHLORIDE 10 MILLIGRAM(S): 5 TABLET ORAL at 12:44

## 2017-05-06 RX ADMIN — Medication 100 MILLIGRAM(S): at 05:32

## 2017-05-06 RX ADMIN — HYDROMORPHONE HYDROCHLORIDE 0.5 MILLIGRAM(S): 2 INJECTION INTRAMUSCULAR; INTRAVENOUS; SUBCUTANEOUS at 00:32

## 2017-05-06 RX ADMIN — HYDROMORPHONE HYDROCHLORIDE 0.5 MILLIGRAM(S): 2 INJECTION INTRAMUSCULAR; INTRAVENOUS; SUBCUTANEOUS at 04:40

## 2017-05-06 RX ADMIN — Medication 1 TABLET(S): at 11:56

## 2017-05-06 NOTE — PROGRESS NOTE ADULT - SUBJECTIVE AND OBJECTIVE BOX
Patient seen and examined. Pain controlled. No acute events overnight. Denies CP/SOB.    HEALTH ISSUES - PROBLEM Dx:        MEDICATIONS  (STANDING):  enoxaparin Injectable 30milliGRAM(s) SubCutaneous every 12 hours  calcium carbonate 1250 mG + Vitamin D (OsCal 500 + D) 1Tablet(s) Oral three times a day  polyethylene glycol 3350 17Gram(s) Oral daily  docusate sodium 100milliGRAM(s) Oral three times a day  ferrous    sulfate 325milliGRAM(s) Oral three times a day with meals  folic acid 1milliGRAM(s) Oral daily  multivitamin 1Tablet(s) Oral daily  ascorbic acid 500milliGRAM(s) Oral two times a day    Allergies    No Known Allergies    Intolerances                            10.4   8.6   )-----------( 171      ( 06 May 2017 07:06 )             29.5     06 May 2017 07:06    142    |  102    |  13     ----------------------------<  98     3.5     |  30     |  0.59     Ca    8.8        06 May 2017 07:06        Vital Signs Last 24 Hrs  T(C): 37.3, Max: 37.6 (05-06 @ 01:30)  T(F): 99.2, Max: 99.6 (05-06 @ 01:30)  HR: 86 (67 - 95)  BP: 137/69 (130/70 - 142/66)  BP(mean): --  RR: 17 (15 - 17)  SpO2: 97% (96% - 99%)    Physical Exam  Gen: NAD  LLE:   Dressing c/d/  +ehl/fhl/ta/gs function  L2-S1 silt  Dp/pt pulse intact  No calf ttp  Compartments soft

## 2017-05-06 NOTE — PROGRESS NOTE ADULT - ASSESSMENT
A/P: 61y Female sp L TKA POD 2  Pain control  DVT ppx  PT/WBAT/OOB  FU labs  Ice/elevate  Incentive spirometry  Dispo planning

## 2017-05-06 NOTE — CHART NOTE - NSCHARTNOTEFT_GEN_A_CORE
pt insurance not covering 4 weeks of Lovenox, only 2 weeks  pt will use lovenox 30mg subq twice daily for 14 days then start aspirin 325 mg twice a day for the next 2 weeks, for a total of 4 weeks dvt ppx.  pt aware of risks vs benefits of using aspirin 325 mg twice a day for the remaining days  all questions answered

## 2017-05-09 DIAGNOSIS — M17.12 UNILATERAL PRIMARY OSTEOARTHRITIS, LEFT KNEE: ICD-10-CM

## 2017-05-09 DIAGNOSIS — Z96.651 PRESENCE OF RIGHT ARTIFICIAL KNEE JOINT: ICD-10-CM

## 2017-05-09 LAB — SURGICAL PATHOLOGY FINAL REPORT - CH: SIGNIFICANT CHANGE UP

## 2017-05-31 ENCOUNTER — OUTPATIENT (OUTPATIENT)
Dept: OUTPATIENT SERVICES | Facility: HOSPITAL | Age: 62
LOS: 1 days | End: 2017-05-31
Payer: MEDICARE

## 2017-05-31 DIAGNOSIS — Z96.659 PRESENCE OF UNSPECIFIED ARTIFICIAL KNEE JOINT: Chronic | ICD-10-CM

## 2017-05-31 DIAGNOSIS — M17.12 UNILATERAL PRIMARY OSTEOARTHRITIS, LEFT KNEE: ICD-10-CM

## 2017-05-31 DIAGNOSIS — N63 UNSPECIFIED LUMP IN BREAST: Chronic | ICD-10-CM

## 2017-05-31 DIAGNOSIS — Z51.89 ENCOUNTER FOR OTHER SPECIFIED AFTERCARE: ICD-10-CM

## 2017-05-31 DIAGNOSIS — H93.91 UNSPECIFIED DISORDER OF RIGHT EAR: Chronic | ICD-10-CM

## 2017-07-26 PROCEDURE — 97110 THERAPEUTIC EXERCISES: CPT

## 2017-07-26 PROCEDURE — G8980: CPT | Mod: CJ

## 2017-07-26 PROCEDURE — 97163 PT EVAL HIGH COMPLEX 45 MIN: CPT

## 2017-07-26 PROCEDURE — 97010 HOT OR COLD PACKS THERAPY: CPT

## 2017-07-26 PROCEDURE — G8978: CPT | Mod: CL

## 2017-07-26 PROCEDURE — 97140 MANUAL THERAPY 1/> REGIONS: CPT

## 2017-07-26 PROCEDURE — G8979: CPT | Mod: CK

## 2018-08-10 NOTE — ASU PREOP CHECKLIST - AS BP NONINV METHOD
Spoke to Lorene from [facility] to begin precert process    Waiting for a call back to complete precert electronic

## 2019-02-23 ENCOUNTER — EMERGENCY (EMERGENCY)
Facility: HOSPITAL | Age: 64
LOS: 1 days | Discharge: HOME | End: 2019-02-23
Attending: EMERGENCY MEDICINE
Payer: MEDICARE

## 2019-02-23 VITALS
SYSTOLIC BLOOD PRESSURE: 138 MMHG | WEIGHT: 158.07 LBS | HEART RATE: 73 BPM | HEIGHT: 63 IN | OXYGEN SATURATION: 97 % | RESPIRATION RATE: 18 BRPM | TEMPERATURE: 99 F | DIASTOLIC BLOOD PRESSURE: 88 MMHG

## 2019-02-23 DIAGNOSIS — H93.91 UNSPECIFIED DISORDER OF RIGHT EAR: Chronic | ICD-10-CM

## 2019-02-23 DIAGNOSIS — Z96.659 PRESENCE OF UNSPECIFIED ARTIFICIAL KNEE JOINT: Chronic | ICD-10-CM

## 2019-02-23 DIAGNOSIS — N63 UNSPECIFIED LUMP IN BREAST: Chronic | ICD-10-CM

## 2019-02-23 LAB
ALBUMIN SERPL ELPH-MCNC: 4.3 G/DL — SIGNIFICANT CHANGE UP (ref 3.3–5.2)
ALP SERPL-CCNC: 97 U/L — SIGNIFICANT CHANGE UP (ref 40–120)
ALT FLD-CCNC: 68 U/L — HIGH
ANION GAP SERPL CALC-SCNC: 10 MMOL/L — SIGNIFICANT CHANGE UP (ref 5–17)
AST SERPL-CCNC: 43 U/L — HIGH
BILIRUB SERPL-MCNC: 0.3 MG/DL — LOW (ref 0.4–2)
BUN SERPL-MCNC: 16 MG/DL — SIGNIFICANT CHANGE UP (ref 8–20)
CALCIUM SERPL-MCNC: 9.6 MG/DL — SIGNIFICANT CHANGE UP (ref 8.6–10.2)
CHLORIDE SERPL-SCNC: 104 MMOL/L — SIGNIFICANT CHANGE UP (ref 98–107)
CO2 SERPL-SCNC: 27 MMOL/L — SIGNIFICANT CHANGE UP (ref 22–29)
CREAT SERPL-MCNC: 0.54 MG/DL — SIGNIFICANT CHANGE UP (ref 0.5–1.3)
GLUCOSE SERPL-MCNC: 115 MG/DL — SIGNIFICANT CHANGE UP (ref 70–115)
POTASSIUM SERPL-MCNC: 4.6 MMOL/L — SIGNIFICANT CHANGE UP (ref 3.5–5.3)
POTASSIUM SERPL-SCNC: 4.6 MMOL/L — SIGNIFICANT CHANGE UP (ref 3.5–5.3)
PROT SERPL-MCNC: 7.8 G/DL — SIGNIFICANT CHANGE UP (ref 6.6–8.7)
SODIUM SERPL-SCNC: 141 MMOL/L — SIGNIFICANT CHANGE UP (ref 135–145)
TROPONIN T SERPL-MCNC: <0.01 NG/ML — SIGNIFICANT CHANGE UP (ref 0–0.06)

## 2019-02-23 PROCEDURE — 84484 ASSAY OF TROPONIN QUANT: CPT

## 2019-02-23 PROCEDURE — 93010 ELECTROCARDIOGRAM REPORT: CPT

## 2019-02-23 PROCEDURE — 93005 ELECTROCARDIOGRAM TRACING: CPT

## 2019-02-23 PROCEDURE — 71250 CT THORAX DX C-: CPT

## 2019-02-23 PROCEDURE — 71250 CT THORAX DX C-: CPT | Mod: 26

## 2019-02-23 PROCEDURE — 99285 EMERGENCY DEPT VISIT HI MDM: CPT

## 2019-02-23 PROCEDURE — 99284 EMERGENCY DEPT VISIT MOD MDM: CPT | Mod: 25

## 2019-02-23 PROCEDURE — 71046 X-RAY EXAM CHEST 2 VIEWS: CPT

## 2019-02-23 PROCEDURE — 71046 X-RAY EXAM CHEST 2 VIEWS: CPT | Mod: 26

## 2019-02-23 PROCEDURE — 36415 COLL VENOUS BLD VENIPUNCTURE: CPT

## 2019-02-23 PROCEDURE — 80053 COMPREHEN METABOLIC PANEL: CPT

## 2019-02-23 NOTE — ED PROVIDER NOTE - CLINICAL SUMMARY MEDICAL DECISION MAKING FREE TEXT BOX
Pt with R sided back pain that radiated to RUQ after sneezing. Resolved, ekg non-ischemic, labs normal, well appearing, imaging neg, stable for dc

## 2019-02-23 NOTE — ED PROVIDER NOTE - PROGRESS NOTE DETAILS
patient is comfoatbel, xray with increase marking a ct done, signed out to am ed attending Eva: pt re-evaluated,. no pain currenly. Abd ios soft and non-tender, toelrated PO. Never had chst pain or SOB. C neg, likely msk pain/spasm after sneezing, stable for dc

## 2019-02-23 NOTE — ED PROVIDER NOTE - OBJECTIVE STATEMENT
63y old on pain management for hand pain, gets routine tests for arthritis, presents with compliats of post chest wall spasm, and bakari t7/8 side pain, radiating ant, after sneezing, no chest pain, no sob, started immediately after coughing, no previous h/o same, has undergone a cardiology clearance for a surgery last year, at present she doesn't have ain, states she has had no pain since ambulance was called, no n/v, no sob, no fever, no chills, aggravted on time with coughing, then resolved, relieved spontaneously

## 2019-02-23 NOTE — ED ADULT NURSE NOTE - CHIEF COMPLAINT QUOTE
Patient A&Ox4 complaining of back pain & pain to epigastric area when she bends over. Stated Sx began tonight after she sneezed "hard." Patient denies any chest pain or shortness of breath.

## 2019-02-23 NOTE — ED PROVIDER NOTE - PHYSICAL EXAMINATION
Constitutional : Appears comfortably, talking in full sentences  abl eto speak to patient in her laguage  Head :NC AT , no swelling  Eyes :eomi, no swelling  Mouth :mm moist,  Neck : supple, trachea in midline  Chest :Bg air entry, symm chest expansion, no distress  non tender on exam  Heart :S1 S2 distant  Abdomen :abd soft, non tender  Musc/Skel :ext no swelling, no deformity, no spine tenderness, distal pulses present  Neuro  :AAO 3 no focal deficits

## 2019-02-23 NOTE — ED ADULT NURSE NOTE - NSIMPLEMENTINTERV_GEN_ALL_ED
Implemented All Universal Safety Interventions:  [place] to call system. Call bell, personal items and telephone within reach. Instruct patient to call for assistance. Room bathroom lighting operational. Non-slip footwear when patient is off stretcher. Physically safe environment: no spills, clutter or unnecessary equipment. Stretcher in lowest position, wheels locked, appropriate side rails in place.

## 2020-12-09 ENCOUNTER — EMERGENCY (EMERGENCY)
Facility: HOSPITAL | Age: 65
LOS: 1 days | Discharge: HOME | End: 2020-12-09
Attending: EMERGENCY MEDICINE
Payer: MEDICARE

## 2020-12-09 VITALS
RESPIRATION RATE: 16 BRPM | SYSTOLIC BLOOD PRESSURE: 155 MMHG | HEIGHT: 63 IN | HEART RATE: 85 BPM | TEMPERATURE: 98 F | DIASTOLIC BLOOD PRESSURE: 88 MMHG | OXYGEN SATURATION: 97 % | WEIGHT: 160.06 LBS

## 2020-12-09 DIAGNOSIS — Z96.659 PRESENCE OF UNSPECIFIED ARTIFICIAL KNEE JOINT: Chronic | ICD-10-CM

## 2020-12-09 DIAGNOSIS — H93.91 UNSPECIFIED DISORDER OF RIGHT EAR: Chronic | ICD-10-CM

## 2020-12-09 DIAGNOSIS — N63 UNSPECIFIED LUMP IN BREAST: Chronic | ICD-10-CM

## 2020-12-09 DIAGNOSIS — Z96.652 PRESENCE OF LEFT ARTIFICIAL KNEE JOINT: Chronic | ICD-10-CM

## 2020-12-09 PROCEDURE — 72125 CT NECK SPINE W/O DYE: CPT

## 2020-12-09 PROCEDURE — 70486 CT MAXILLOFACIAL W/O DYE: CPT

## 2020-12-09 PROCEDURE — 70450 CT HEAD/BRAIN W/O DYE: CPT | Mod: 26

## 2020-12-09 PROCEDURE — 73562 X-RAY EXAM OF KNEE 3: CPT | Mod: 26,RT

## 2020-12-09 PROCEDURE — 70450 CT HEAD/BRAIN W/O DYE: CPT

## 2020-12-09 PROCEDURE — 99284 EMERGENCY DEPT VISIT MOD MDM: CPT

## 2020-12-09 PROCEDURE — 70486 CT MAXILLOFACIAL W/O DYE: CPT | Mod: 26

## 2020-12-09 PROCEDURE — 73562 X-RAY EXAM OF KNEE 3: CPT

## 2020-12-09 PROCEDURE — 99284 EMERGENCY DEPT VISIT MOD MDM: CPT | Mod: 25

## 2020-12-09 PROCEDURE — 72125 CT NECK SPINE W/O DYE: CPT | Mod: 26

## 2020-12-09 RX ORDER — OXYCODONE AND ACETAMINOPHEN 5; 325 MG/1; MG/1
1 TABLET ORAL ONCE
Refills: 0 | Status: DISCONTINUED | OUTPATIENT
Start: 2020-12-09 | End: 2020-12-09

## 2020-12-09 RX ADMIN — OXYCODONE AND ACETAMINOPHEN 1 TABLET(S): 5; 325 TABLET ORAL at 19:53

## 2020-12-09 NOTE — ED PROVIDER NOTE - PSH
Breast mass, left  Removed ( 2011 )  Ear problem, right  Had surgery  H/O total knee replacement, left    S/P knee replacement  right

## 2020-12-09 NOTE — ED PROVIDER NOTE - CLINICAL SUMMARY MEDICAL DECISION MAKING FREE TEXT BOX
67 y/o F s/p fall CT, xray, pain control. 65 y/o F s/p fall CT, xray, pain control. xray with no acute findings. CT scan reviewed and findings discussed with pt and daughter. Will dc home. Pt to continue pain meds as per her daily routine. Follow up with pcp, thyroid nodule discussed for follow up with ultrasound out PT. Pt c/o tooth pain daughter will take mom in the morning to see there dentist. No fracture to front teeth seen, teeth did not move.

## 2020-12-09 NOTE — ED PROVIDER NOTE - PATIENT PORTAL LINK FT
You can access the FollowMyHealth Patient Portal offered by [facility] by registering at the following website: [email or web address]. By joining [facility]’s FollowMyHealth portal, you will also be able to view your health information using other applications (apps) compatible with our system.

## 2020-12-09 NOTE — ED PROVIDER NOTE - ATTENDING CONTRIBUTION TO CARE
The patient seen and examined    Nasal Abrasion    I, Corey Hansen, performed the initial face to face bedside interview with this patient regarding history of present illness, review of symptoms and relevant past medical, social and family history.  I completed an independent physical examination.  I was the initial provider who evaluated this patient. I have signed out the follow up of any pending tests (i.e. labs, radiological studies) to the ACP.  I have communicated the patient’s plan of care and disposition with the ACP.

## 2020-12-09 NOTE — ED PROVIDER NOTE - NSFOLLOWUPINSTRUCTIONS_ED_ALL_ED_FT
Return to ED with any new concerns or worsening symptoms.     Follow up with your primary medical dr within 24 to 48 hours. Bring a copy of today's results. Follow up ultrasound of thyroid nodule to be done out patient discuss this with your primary.     Follow up with your dentist in the morning. If you can get  in go to [facility]    Continue with your pain medications as scheduled. Skip tonight's dose since you just received percocet.

## 2020-12-09 NOTE — ED PROVIDER NOTE - NSFOLLOWUPCLINICS_GEN_ALL_ED_FT
[facility]  [place] [address]  Phone: [phone]  Fax:   Follow Up Time: Urgent

## 2020-12-09 NOTE — ED PROVIDER NOTE - OBJECTIVE STATEMENT
64 y/o F with pmhx of arthritis b/l knee replacement presents to ED s/p trip and fall this afternoon. Daughter states she called her after the fall and she came home right away. Mom denies LOC but daughter said she seemed confused. Mom denies back, shoulder, chest, hip or abdominal pain. Mom states she fell hitting face and broke fall also with her knees and rt hand. But denies tenderness to rt hand. Pt offered  and declined, only wanted her daughter used. Pt takes Tramadol 50mg TID for arthritic pain.

## 2020-12-09 NOTE — ED ADULT TRIAGE NOTE - CHIEF COMPLAINT QUOTE
Pt A&Ox4 states "I tripped and fell and hit my face." Patient called daughter saying she fell was initially a bit confused, now back to baseline according to daughter, denies blood thinner, unsure if any LOC, denies nausea.

## 2020-12-09 NOTE — ED PROVIDER NOTE - CARE PLAN
Principal Discharge DX:	Abrasion of nose  Secondary Diagnosis:	Abrasion of lip, initial encounter  Secondary Diagnosis:	Knee pain, bilateral

## 2020-12-09 NOTE — ED PROVIDER NOTE - PHYSICAL EXAMINATION
HENMT: Normocephalic, +TTP and abrasion to bridge and tip of nose. No bleeding or swelling, no intranasal hematoma seen. + swelling, abrasion without laceration to top of middle lip and cupids bow.     +TTP over rt and lt knee, able to flex and extend. +swelling over rt knee. Able to internal and external rotate at hip b/l  No cervical spine tenderness or midline back tenderness. Pt able to ambulate.   Full ROM of upper extremities 5/5 strength all extremities.

## 2022-04-27 NOTE — H&P PST ADULT - NS ABD PE RECTAL EXAM
Sski Pregnancy And Lactation Text: This medication is Pregnancy Category D and isn't considered safe during pregnancy. It is excreted in breast milk. not examined

## 2022-10-19 NOTE — ED ADULT NURSE NOTE - NSFALLRSKASSESSTYPE_ED_ALL_ED
Received Completed forms Yes   Faxed Forms Faxed To: [facility]  Fax Number: [phone]   Sent to HIM (Date) 10/14/22       
Initial (On Arrival)

## 2022-11-11 NOTE — PHYSICAL THERAPY INITIAL EVALUATION ADULT - LIVES WITH, PROFILE
children/LIves in a private house,5 grown children, 3 steps to enter with rails, no steps to use inside the house.
No restrictions

## 2023-06-28 NOTE — ED ADULT TRIAGE NOTE - CHIEF COMPLAINT QUOTE
Patient A&Ox4 complaining of back pain & pain to epigastric area when she bends over. Stated Sx began tonight after she sneezed "hard." Patient denies any chest pain or shortness of breath. 2 seconds or less

## 2023-08-11 NOTE — OCCUPATIONAL THERAPY INITIAL EVALUATION ADULT - TRANSFER SAFETY CONCERNS NOTED: TOILET, REHAB EVAL
no stepping too close to front of assistive device/decreased safety awareness/decreased sequencing ability/decreased step length/inability to maintain weight-bearing restrictions w/o assist/decreased weight-shifting ability

## 2025-02-05 ENCOUNTER — OFFICE (OUTPATIENT)
Dept: URBAN - METROPOLITAN AREA CLINIC 6 | Facility: CLINIC | Age: 70
Setting detail: OPHTHALMOLOGY
End: 2025-02-05
Payer: MEDICARE

## 2025-02-05 DIAGNOSIS — H25.013: ICD-10-CM

## 2025-02-05 DIAGNOSIS — E11.9: ICD-10-CM

## 2025-02-05 DIAGNOSIS — H25.13: ICD-10-CM

## 2025-02-05 PROCEDURE — 99204 OFFICE O/P NEW MOD 45 MIN: CPT | Performed by: OPHTHALMOLOGY

## 2025-02-05 ASSESSMENT — REFRACTION_AUTOREFRACTION
OD_CYLINDER: -1.50
OD_AXIS: 178
OS_CYLINDER: -1.25
OS_AXIS: 159
OS_SPHERE: +0.50
OD_SPHERE: +1.00

## 2025-02-05 ASSESSMENT — TONOMETRY
OD_IOP_MMHG: 16
OS_IOP_MMHG: 13

## 2025-02-05 ASSESSMENT — KERATOMETRY
OD_K2POWER_DIOPTERS: 45.75
OS_K2POWER_DIOPTERS: 45.75
OS_AXISANGLE_DEGREES: 39
OS_K1POWER_DIOPTERS: 45.00
OD_AXISANGLE_DEGREES: 57
OD_K1POWER_DIOPTERS: 44.75

## 2025-02-05 ASSESSMENT — REFRACTION_CURRENTRX
OD_CYLINDER: SPH
OS_AXIS: 76
OD_OVR_VA: 20/
OD_AXIS: SPH
OS_OVR_VA: 20/
OS_SPHERE: +3.75
OD_VPRISM_DIRECTION: BF
OD_SPHERE: +2.50
OS_ADD: +3.25
OD_ADD: +3.00
OS_CYLINDER: -1.50
OS_VPRISM_DIRECTION: BF

## 2025-02-05 ASSESSMENT — CONFRONTATIONAL VISUAL FIELD TEST (CVF)
OS_FINDINGS: FULL
OD_FINDINGS: FULL

## 2025-02-05 ASSESSMENT — VISUAL ACUITY
OS_BCVA: 20/70
OD_BCVA: 20/100

## 2025-02-05 ASSESSMENT — REFRACTION_MANIFEST
OD_VA1: 20/30
OD_AXIS: 175
OU_VA: 20/30-2
OD_CYLINDER: -1.25
OD_SPHERE: +1.25
OS_VA1: 20/50+1
OS_SPHERE: +0.50
OS_CYLINDER: -1.50
OS_AXIS: 160

## 2025-02-12 ENCOUNTER — OFFICE (OUTPATIENT)
Dept: URBAN - METROPOLITAN AREA CLINIC 1 | Facility: CLINIC | Age: 70
Setting detail: OPHTHALMOLOGY
End: 2025-02-12
Payer: MEDICARE

## 2025-02-12 DIAGNOSIS — H25.12: ICD-10-CM

## 2025-02-12 DIAGNOSIS — H25.13: ICD-10-CM

## 2025-02-12 DIAGNOSIS — H25.013: ICD-10-CM

## 2025-02-12 PROCEDURE — 99213 OFFICE O/P EST LOW 20 MIN: CPT | Performed by: OPHTHALMOLOGY

## 2025-02-12 PROCEDURE — 92136 OPHTHALMIC BIOMETRY: CPT | Mod: LT | Performed by: OPHTHALMOLOGY

## 2025-02-12 ASSESSMENT — REFRACTION_AUTOREFRACTION
OD_SPHERE: -5.25
OD_CYLINDER: SPH
OS_AXIS: 095
OS_SPHERE: -1.00
OS_CYLINDER: -2.25

## 2025-02-12 ASSESSMENT — REFRACTION_CURRENTRX
OD_AXIS: SPH
OS_OVR_VA: 20/
OD_OVR_VA: 20/
OD_ADD: +3.00
OS_SPHERE: +3.75
OS_VPRISM_DIRECTION: BF
OD_VPRISM_DIRECTION: BF
OD_SPHERE: +2.50
OS_CYLINDER: -1.50
OD_CYLINDER: SPH
OS_ADD: +3.25
OS_AXIS: 76

## 2025-02-12 ASSESSMENT — REFRACTION_MANIFEST
OD_CYLINDER: -1.25
OS_CYLINDER: -1.50
OS_VA1: 20/50+1
OS_SPHERE: +0.50
OD_SPHERE: +1.25
OD_AXIS: 175
OU_VA: 20/30-2
OD_VA1: 20/30
OS_AXIS: 160

## 2025-02-12 ASSESSMENT — KERATOMETRY
OD_K2POWER_DIOPTERS: 45.75
OD_K1POWER_DIOPTERS: 45.00
OS_K2POWER_DIOPTERS: 46.25
OS_K1POWER_DIOPTERS: 45.50
OD_AXISANGLE_DEGREES: 141
OS_AXISANGLE_DEGREES: 044

## 2025-02-12 ASSESSMENT — CONFRONTATIONAL VISUAL FIELD TEST (CVF)
OS_FINDINGS: FULL
OD_FINDINGS: FULL

## 2025-02-12 ASSESSMENT — TONOMETRY
OD_IOP_MMHG: 12
OS_IOP_MMHG: 16

## 2025-02-12 ASSESSMENT — VISUAL ACUITY
OD_BCVA: 20/100
OS_BCVA: 20/70

## 2025-02-19 ENCOUNTER — ASC (OUTPATIENT)
Dept: URBAN - METROPOLITAN AREA SURGERY 8 | Facility: SURGERY | Age: 70
Setting detail: OPHTHALMOLOGY
End: 2025-02-19
Payer: MEDICARE

## 2025-02-19 DIAGNOSIS — H25.012: ICD-10-CM

## 2025-02-19 DIAGNOSIS — H25.12: ICD-10-CM

## 2025-02-19 DIAGNOSIS — H25.89: ICD-10-CM

## 2025-02-19 PROCEDURE — 66982 XCAPSL CTRC RMVL CPLX WO ECP: CPT | Mod: LT | Performed by: OPHTHALMOLOGY

## 2025-02-20 ENCOUNTER — RX ONLY (RX ONLY)
Age: 70
End: 2025-02-20

## 2025-02-20 ENCOUNTER — OFFICE (OUTPATIENT)
Dept: URBAN - METROPOLITAN AREA CLINIC 1 | Facility: CLINIC | Age: 70
Setting detail: OPHTHALMOLOGY
End: 2025-02-20
Payer: MEDICARE

## 2025-02-20 DIAGNOSIS — H25.11: ICD-10-CM

## 2025-02-20 PROCEDURE — 92136 OPHTHALMIC BIOMETRY: CPT | Mod: RT | Performed by: OPHTHALMOLOGY

## 2025-02-20 ASSESSMENT — REFRACTION_CURRENTRX
OD_OVR_VA: 20/
OD_VPRISM_DIRECTION: BF
OS_ADD: +3.25
OS_CYLINDER: -1.50
OD_AXIS: SPH
OS_SPHERE: +3.75
OD_SPHERE: +2.50
OD_ADD: +3.00
OS_VPRISM_DIRECTION: BF
OD_CYLINDER: SPH
OS_AXIS: 76
OS_OVR_VA: 20/

## 2025-02-20 ASSESSMENT — REFRACTION_MANIFEST
OD_VA1: 20/30
OS_CYLINDER: -1.50
OS_AXIS: 160
OD_AXIS: 175
OD_SPHERE: +1.25
OD_CYLINDER: -1.25
OU_VA: 20/30-2
OS_VA1: 20/50+1
OS_SPHERE: +0.50

## 2025-02-20 ASSESSMENT — KERATOMETRY
OD_AXISANGLE_DEGREES: 136
OD_K1POWER_DIOPTERS: 45.00
OS_K1POWER_DIOPTERS: UNABLE
OD_K2POWER_DIOPTERS: 46.00

## 2025-02-20 ASSESSMENT — REFRACTION_AUTOREFRACTION
OD_CYLINDER: -0.25
OD_SPHERE: +3.50
OS_SPHERE: -5.50
OD_AXIS: 105
OS_AXIS: 134
OS_CYLINDER: -0.25

## 2025-02-20 ASSESSMENT — TONOMETRY
OD_IOP_MMHG: 15
OS_IOP_MMHG: 19

## 2025-02-20 ASSESSMENT — VISUAL ACUITY
OS_BCVA: 20/70
OD_BCVA: 20/250

## 2025-02-20 ASSESSMENT — CONFRONTATIONAL VISUAL FIELD TEST (CVF)
OS_FINDINGS: FULL
OD_FINDINGS: FULL

## 2025-02-20 ASSESSMENT — CORNEAL EDEMA CLINICAL DESCRIPTION: OS_CORNEALEDEMA: 1+ 2+

## 2025-03-01 ENCOUNTER — RX ONLY (RX ONLY)
Age: 70
End: 2025-03-01

## 2025-03-01 ENCOUNTER — OFFICE (OUTPATIENT)
Dept: URBAN - METROPOLITAN AREA CLINIC 1 | Facility: CLINIC | Age: 70
Setting detail: OPHTHALMOLOGY
End: 2025-03-01
Payer: MEDICARE

## 2025-03-01 DIAGNOSIS — Z96.1: ICD-10-CM

## 2025-03-01 PROBLEM — H25.11 CATARACT; RIGHT EYE: Status: RESOLVED | Noted: 2025-02-20 | Resolved: 2025-03-01

## 2025-03-01 PROBLEM — E11.9 DIABETES TYPE 2 NO RETINOPATHY ; BOTH EYES: Status: ACTIVE | Noted: 2025-02-05

## 2025-03-01 PROBLEM — H25.011: Status: RESOLVED | Noted: 2025-02-20 | Resolved: 2025-03-01

## 2025-03-01 PROCEDURE — 99024 POSTOP FOLLOW-UP VISIT: CPT

## 2025-03-01 ASSESSMENT — REFRACTION_MANIFEST
OD_VA1: 20/30
OS_SPHERE: +0.50
OS_CYLINDER: -1.50
OS_VA1: 20/50+1
OD_AXIS: 175
OD_CYLINDER: -1.25
OD_SPHERE: +1.25
OS_AXIS: 160
OU_VA: 20/30-2

## 2025-03-01 ASSESSMENT — REFRACTION_CURRENTRX
OS_OVR_VA: 20/
OS_CYLINDER: -1.50
OD_AXIS: SPH
OS_ADD: +3.25
OD_SPHERE: +2.50
OS_AXIS: 76
OS_SPHERE: +3.75
OD_ADD: +3.00
OD_CYLINDER: SPH
OD_OVR_VA: 20/
OS_VPRISM_DIRECTION: BF
OD_VPRISM_DIRECTION: BF

## 2025-03-01 ASSESSMENT — CORNEAL EDEMA - FOLDS/STRIAE: OD_FOLDSSTRIAE: T 1+

## 2025-03-01 ASSESSMENT — REFRACTION_AUTOREFRACTION
OD_AXIS: 066
OS_SPHERE: +0.25
OS_CYLINDER: -0.50
OD_SPHERE: -0.75
OD_CYLINDER: -3.25
OS_AXIS: 111

## 2025-03-01 ASSESSMENT — CONFRONTATIONAL VISUAL FIELD TEST (CVF)
OD_FINDINGS: FULL
OS_FINDINGS: FULL

## 2025-03-01 ASSESSMENT — CORNEAL EDEMA CLINICAL DESCRIPTION: OD_CORNEALEDEMA: T

## 2025-03-01 ASSESSMENT — KERATOMETRY
OS_AXISANGLE_DEGREES: 039
OD_K2POWER_DIOPTERS: 45.75
OS_K2POWER_DIOPTERS: 45.50
OS_K1POWER_DIOPTERS: 45.25
OD_K1POWER_DIOPTERS: 44.50
OD_AXISANGLE_DEGREES: 153

## 2025-03-01 ASSESSMENT — TONOMETRY
OD_IOP_MMHG: 18
OS_IOP_MMHG: 15

## 2025-03-01 ASSESSMENT — VISUAL ACUITY
OS_BCVA: 20/80-1
OD_BCVA: 20/40-

## 2025-03-27 ENCOUNTER — OFFICE (OUTPATIENT)
Dept: URBAN - METROPOLITAN AREA CLINIC 6 | Facility: CLINIC | Age: 70
Setting detail: OPHTHALMOLOGY
End: 2025-03-27
Payer: MEDICARE

## 2025-03-27 DIAGNOSIS — Z96.1: ICD-10-CM

## 2025-03-27 PROCEDURE — 99024 POSTOP FOLLOW-UP VISIT: CPT

## 2025-03-27 ASSESSMENT — REFRACTION_MANIFEST
OS_SPHERE: +0.75
OD_SPHERE: +0.75
OD_VA1: 20/25-
OD_AXIS: 60
OS_ADD: +2.50
OS_AXIS: 110
OS_CYLINDER: -0.75
OS_VA1: 20/25-
OD_CYLINDER: -1.25
OD_ADD: +2.50

## 2025-03-27 ASSESSMENT — TONOMETRY
OD_IOP_MMHG: 15
OS_IOP_MMHG: 14

## 2025-03-27 ASSESSMENT — REFRACTION_CURRENTRX
OD_VPRISM_DIRECTION: BF
OS_SPHERE: +3.75
OD_OVR_VA: 20/
OS_ADD: +3.25
OS_VPRISM_DIRECTION: BF
OD_CYLINDER: SPH
OS_OVR_VA: 20/
OS_CYLINDER: -1.50
OD_SPHERE: +2.50
OD_ADD: +3.00
OD_AXIS: SPH
OS_AXIS: 76

## 2025-03-27 ASSESSMENT — KERATOMETRY
OD_K1POWER_DIOPTERS: 44.50
OD_K2POWER_DIOPTERS: 45.75
OS_K2POWER_DIOPTERS: 45.75
OD_AXISANGLE_DEGREES: 143
OS_AXISANGLE_DEGREES: 040
OS_K1POWER_DIOPTERS: 44.75
METHOD_AUTO_MANUAL: AUTO

## 2025-03-27 ASSESSMENT — VISUAL ACUITY
OD_BCVA: 20/25-2
OS_BCVA: 20/40+2

## 2025-03-27 ASSESSMENT — REFRACTION_AUTOREFRACTION
OD_SPHERE: +1.25
OD_AXIS: 058
OS_SPHERE: +0.75
OS_AXIS: 111
OD_CYLINDER: -1.75
OS_CYLINDER: -0.75

## 2025-03-27 ASSESSMENT — CONFRONTATIONAL VISUAL FIELD TEST (CVF)
OS_FINDINGS: FULL
OD_FINDINGS: FULL